# Patient Record
Sex: FEMALE | Race: WHITE | NOT HISPANIC OR LATINO | URBAN - METROPOLITAN AREA
[De-identification: names, ages, dates, MRNs, and addresses within clinical notes are randomized per-mention and may not be internally consistent; named-entity substitution may affect disease eponyms.]

---

## 2020-01-17 ENCOUNTER — OFFICE VISIT (OUTPATIENT)
Dept: URGENT CARE | Facility: CLINIC | Age: 20
End: 2020-01-17
Payer: COMMERCIAL

## 2020-01-17 VITALS
OXYGEN SATURATION: 98 % | HEIGHT: 63 IN | DIASTOLIC BLOOD PRESSURE: 65 MMHG | BODY MASS INDEX: 23.04 KG/M2 | RESPIRATION RATE: 17 BRPM | SYSTOLIC BLOOD PRESSURE: 109 MMHG | HEART RATE: 95 BPM | TEMPERATURE: 97 F | WEIGHT: 130 LBS

## 2020-01-17 DIAGNOSIS — R30.0 DYSURIA: Primary | ICD-10-CM

## 2020-01-17 LAB
B-HCG UR QL: NEGATIVE
BILIRUB UR QL STRIP: NEGATIVE
CTP QC/QA: YES
GLUCOSE UR QL STRIP: NEGATIVE
KETONES UR QL STRIP: NEGATIVE
LEUKOCYTE ESTERASE UR QL STRIP: NEGATIVE
PH, POC UA: 5.5 (ref 5–8)
POC BLOOD, URINE: NEGATIVE
POC NITRATES, URINE: NEGATIVE
PROT UR QL STRIP: NEGATIVE
SP GR UR STRIP: 1 (ref 1–1.03)
UROBILINOGEN UR STRIP-ACNC: NORMAL (ref 0.1–1.1)

## 2020-01-17 PROCEDURE — 81003 POCT URINALYSIS, DIPSTICK, AUTOMATED, W/O SCOPE: ICD-10-PCS | Mod: QW,S$GLB,, | Performed by: EMERGENCY MEDICINE

## 2020-01-17 PROCEDURE — 81003 URINALYSIS AUTO W/O SCOPE: CPT | Mod: QW,S$GLB,, | Performed by: EMERGENCY MEDICINE

## 2020-01-17 PROCEDURE — 81025 POCT URINE PREGNANCY: ICD-10-PCS | Mod: S$GLB,,, | Performed by: EMERGENCY MEDICINE

## 2020-01-17 PROCEDURE — 99203 OFFICE O/P NEW LOW 30 MIN: CPT | Mod: 25,S$GLB,, | Performed by: EMERGENCY MEDICINE

## 2020-01-17 PROCEDURE — 99203 PR OFFICE/OUTPT VISIT, NEW, LEVL III, 30-44 MIN: ICD-10-PCS | Mod: 25,S$GLB,, | Performed by: EMERGENCY MEDICINE

## 2020-01-17 PROCEDURE — 81025 URINE PREGNANCY TEST: CPT | Mod: S$GLB,,, | Performed by: EMERGENCY MEDICINE

## 2020-01-17 RX ORDER — SULFAMETHOXAZOLE AND TRIMETHOPRIM 800; 160 MG/1; MG/1
1 TABLET ORAL 2 TIMES DAILY
Qty: 10 TABLET | Refills: 0 | Status: SHIPPED | OUTPATIENT
Start: 2020-01-17 | End: 2020-01-22

## 2020-01-17 RX ORDER — PHENAZOPYRIDINE HYDROCHLORIDE 100 MG/1
100 TABLET, FILM COATED ORAL 3 TIMES DAILY PRN
Qty: 20 TABLET | Refills: 0 | Status: SHIPPED | OUTPATIENT
Start: 2020-01-17 | End: 2021-01-16

## 2020-01-17 NOTE — PATIENT INSTRUCTIONS
"FORCE FLUIDS  BACTRIM DS RX  PYRIDIUM RX  SEE DYSURIA SHEET  RETURN FOR ANY CONCERNS OR PROBLEMS    IF HAVING ANY PERSISTENT SYMPTOMS WHEN YOU RETURN TO [place], HAVE FULL EVALUATION INCLUDING PELVIC EXAM, URINE CULTURE.    SEE DYSURIA SHEET      Dysuria     Painful urination (dysuria) is often caused by a problem in the urinary tract.   Dysuria is pain felt during urination. It is often described as a burning. Learn more about this problem and how it can be treated.  What causes dysuria?  Possible causes include:  · Infection with a bacteria or virus such as a urinary tract infection (UTI or a sexually transmitted infection (STI)  · Sensitivity or allergy to chemicals such as those found in lotions and other products  · Prostate or bladder problems  · Radiation therapy to the pelvic area  How is dysuria diagnosed?  Your healthcare provider will examine you. He or she will ask about your symptoms and health. After talking with you and doing a physical exam, your healthcare provider may know what is causing your dysuria. He or she will usually request  a sample of your urine. Tests of your urine, or a "urinalysis," are done. A urinalysis may include:  · Looking at the urine sample (visual exam)  · Checking for substances (chemical exam)  · Looking at a small amount under a microscope (microscopic exam)  Some parts of the urinalysis may be done in the provider's office and some in a lab. And, the urine sample may be checked for bacteria and yeast (urine culture). Your healthcare provider will tell you more about these tests if they are needed.  How is dysuria treated?  Treatment depends on the cause. If you have a bacterial infection, you may need antibiotics. You may be given medicines to make it easier for you to urinate and help relieve pain. Your healthcare provider can tell you more about your treatment options. Untreated, symptoms may get worse.  When to call your healthcare provider  Call the healthcare " provider right away if you have any of the following:  · Fever of 100.4°F (38°C) or higher   · No improvement after three days of treatment  · Trouble urinating because of pain  · New or increased discharge from the vagina or penis  · Rash or joint pain  · Increased back or abdominal pain  · Enlarged painful lymph nodes (lumps) in the groin   Date Last Reviewed: 1/1/2017  © 5781-8656 [employer]. [address] [place] [address], [place] [address]. All rights reserved. This information is not intended as a substitute for professional medical care. Always follow your healthcare professional's instructions.

## 2020-01-17 NOTE — PROGRESS NOTES
"Subjective:       Patient ID: Chris Young is a 19 y.o. female.    Vitals:  height is 5' 3" (1.6 m) and weight is 59 kg (130 lb). Her temperature is 97.2 °F (36.2 °C). Her blood pressure is 109/65 and her pulse is 95. Her respiration is 17 and oxygen saturation is 98%.     Chief Complaint: Dysuria    Patient went to [facility] and had urine culture done at beginning of Dec which came back negative went back at the end of Dec had another urine culture done which came back positive, finished course of antibiotics. Now pain and burning when urinating and urgency is back.  NO FEVERS NO BACK PAIN NO VOMITING NO NAUSEA NO VAGINAL DISCHARGE OR RASHES OR ULCERS.  THE PATIENT STATES THIS FEELS SIMILAR TO LAST MONTH WHEN HER 1ST TEST CAME BACK NEGATIVE THEN FOLLOW-UP CULTURE WAS POSITIVE.  REPORTS PAINFUL URINATION AS WELL AS INCREASED FREQUENCY.    Dysuria    This is a new problem. The current episode started more than 1 month ago. The problem occurs every urination. The problem has been unchanged. The quality of the pain is described as burning. She is sexually active. Associated symptoms include frequency and urgency. Pertinent negatives include no chills, hematuria, nausea, vomiting or rash. She has tried antibiotics (uricalm) for the symptoms. The treatment provided no relief.       Constitution: Negative for chills and fever.   Neck: Negative for painful lymph nodes.   Gastrointestinal: Negative for abdominal pain, nausea and vomiting.   Genitourinary: Positive for dysuria, frequency and urgency. Negative for urine decreased, hematuria, history of kidney stones, painful menstruation, irregular menstruation, missed menses, heavy menstrual bleeding, ovarian cysts, genital trauma, vaginal pain, vaginal discharge, vaginal bleeding, vaginal odor, painful intercourse, genital sore, painful ejaculation and pelvic pain.   Musculoskeletal: Positive for back pain.   Skin: Negative for rash and lesion.   Hematologic/Lymphatic: " Negative for swollen lymph nodes.       Objective:      Physical Exam   Constitutional: She is oriented to person, place, and time. She appears well-developed and well-nourished.   HENT:   Head: Normocephalic and atraumatic.   Nose: No nasal deformity. No epistaxis.   Mouth/Throat: Mucous membranes are normal.   Eyes: Lids are normal.   Neck: Trachea normal, normal range of motion and phonation normal. Neck supple.   Cardiovascular: Normal pulses.   Pulmonary/Chest: Effort normal.   Abdominal: Soft. Normal appearance and bowel sounds are normal. She exhibits no distension. There is no tenderness. There is no CVA tenderness.   NO CVAT   Neurological: She is alert and oriented to person, place, and time.   Skin: Skin is warm, dry and intact.   Psychiatric: She has a normal mood and affect. Her speech is normal and behavior is normal. Cognition and memory are normal.   Nursing note and vitals reviewed.        Assessment:       1. Dysuria        Plan:         Dysuria  -     POCT Urinalysis, Dipstick, Automated, W/O Scope  -     POCT urine pregnancy    Other orders  -     sulfamethoxazole-trimethoprim 800-160mg (BACTRIM DS) 800-160 mg Tab; Take 1 tablet by mouth 2 (two) times daily. for 5 days  Dispense: 10 tablet; Refill: 0  -     phenazopyridine (PYRIDIUM) 100 MG tablet; Take 1 tablet (100 mg total) by mouth 3 (three) times daily as needed for Pain.  Dispense: 20 tablet; Refill: 0         Patient Instructions     FORCE FLUIDS  BACTRIM DS RX  PYRIDIUM RX  SEE DYSURIA SHEET  RETURN FOR ANY CONCERNS OR PROBLEMS    IF HAVING ANY PERSISTENT SYMPTOMS WHEN YOU RETURN TO [place], HAVE FULL EVALUATION INCLUDING PELVIC EXAM, URINE CULTURE.    SEE DYSURIA SHEET      Dysuria     Painful urination (dysuria) is often caused by a problem in the urinary tract.   Dysuria is pain felt during urination. It is often described as a burning. Learn more about this problem and how it can be treated.  What causes dysuria?  Possible causes  "include:  · Infection with a bacteria or virus such as a urinary tract infection (UTI or a sexually transmitted infection (STI)  · Sensitivity or allergy to chemicals such as those found in lotions and other products  · Prostate or bladder problems  · Radiation therapy to the pelvic area  How is dysuria diagnosed?  Your healthcare provider will examine you. He or she will ask about your symptoms and health. After talking with you and doing a physical exam, your healthcare provider may know what is causing your dysuria. He or she will usually request  a sample of your urine. Tests of your urine, or a "urinalysis," are done. A urinalysis may include:  · Looking at the urine sample (visual exam)  · Checking for substances (chemical exam)  · Looking at a small amount under a microscope (microscopic exam)  Some parts of the urinalysis may be done in the provider's office and some in a lab. And, the urine sample may be checked for bacteria and yeast (urine culture). Your healthcare provider will tell you more about these tests if they are needed.  How is dysuria treated?  Treatment depends on the cause. If you have a bacterial infection, you may need antibiotics. You may be given medicines to make it easier for you to urinate and help relieve pain. Your healthcare provider can tell you more about your treatment options. Untreated, symptoms may get worse.  When to call your healthcare provider  Call the healthcare provider right away if you have any of the following:  · Fever of 100.4°F (38°C) or higher   · No improvement after three days of treatment  · Trouble urinating because of pain  · New or increased discharge from the vagina or penis  · Rash or joint pain  · Increased back or abdominal pain  · Enlarged painful lymph nodes (lumps) in the groin   Date Last Reviewed: 1/1/2017  © 8218-3972 [employer]. [address] [place] [address], [place] [address]. All rights reserved. This information is not intended as a " substitute for professional medical care. Always follow your healthcare professional's instructions.